# Patient Record
Sex: FEMALE | Race: BLACK OR AFRICAN AMERICAN | Employment: UNEMPLOYED | URBAN - METROPOLITAN AREA
[De-identification: names, ages, dates, MRNs, and addresses within clinical notes are randomized per-mention and may not be internally consistent; named-entity substitution may affect disease eponyms.]

---

## 2020-11-20 ENCOUNTER — HOSPITAL ENCOUNTER (OUTPATIENT)
Dept: VASCULAR LAB | Age: 70
Discharge: HOME | End: 2020-11-20
Payer: MEDICARE

## 2020-11-20 PROCEDURE — 93925 LOWER EXTREMITY STUDY: CPT

## 2020-11-23 ENCOUNTER — OFFICE VISIT (OUTPATIENT)
Dept: SURGERY | Age: 70
End: 2020-11-23
Payer: MEDICARE

## 2020-11-23 VITALS
BODY MASS INDEX: 23.04 KG/M2 | HEIGHT: 63 IN | SYSTOLIC BLOOD PRESSURE: 158 MMHG | WEIGHT: 130 LBS | DIASTOLIC BLOOD PRESSURE: 74 MMHG

## 2020-11-23 DIAGNOSIS — Z01.818 PRE-OP TESTING: ICD-10-CM

## 2020-11-23 PROCEDURE — 99204 OFFICE O/P NEW MOD 45 MIN: CPT | Performed by: STUDENT IN AN ORGANIZED HEALTH CARE EDUCATION/TRAINING PROGRAM

## 2020-11-23 ASSESSMENT — ENCOUNTER SYMPTOMS
ABDOMINAL PAIN: 0
SHORTNESS OF BREATH: 0
EYES NEGATIVE: 1
BACK PAIN: 1
VOMITING: 0
COUGH: 0
ABDOMINAL DISTENTION: 0
NAUSEA: 0

## 2020-11-23 NOTE — LETTER
PERIPHERAL ANGIOGRAM    Keerthi, your procedure has been scheduled for:          Date:   Dec. 2                      Arrival Time:  630am  Procedure at 1am     Make sure all blood work is completed before day of procedure. (CBC & BMP)     REPORT TO:  Cherrie Esquivel. Maxx Joe  Will be performing your procedure. If you have any questions or to reschedule, please call [phone] and ask for the doctor's medical assistant. WHAT IS A PERIPHERAL ANGIOGRAM?  An angiogram if a test used to diagnose problems with your arteries/veins. You may be familiar with a coronary angiogram, a test that focuses on the blood vessels feed the heart. In a peripheral angiogram, a doctor checks for problems in the blood vessels in other areas of your body. An angiogram uses real-time image guidance (fluoroscopy) and provides detailed information to help in planning the best treatment for you. How long does it take? A peripheral angiogram takes about 1-2 hours, longer if an intervention is performed. You will be in the hospital for at least 2-4 hours after the procedure is finished. HOW TO PREPARE:  *  Please notify us before the procedure if you are allergic to anything, especially x-ray     Contrast dye, shell fish, iodine, nickel or any type of jewelry. THIS IS VERY IMPORTANT. *  DO NOT eat or drink anything containing CAFFEINE for at least 24 hours  Prior to      Your procedure. *  DO NOT eat or drink anything after midnight (or 8 hours) prior to the procedure. *  If your procedure is scheduled in the afternoon, do not eat or drink anything after     ____________. You may have a light breakfast with juice. *  Please take your morning medication with a small amount of water at your normally     scheduled time, including any blood pressure pills, Plavix and Aspirin. occur, which creates delays. The staff will do everything possible to ensure your procedure remains on time.

## 2020-11-23 NOTE — PROGRESS NOTES
tablet 2    methylPREDNISolone (MEDROL, JOSESITO,) 4 MG tablet Take by mouth. 1 kit 0    ibuprofen (ADVIL;MOTRIN) 800 MG tablet Take 1 tablet by mouth every 8 hours as needed for Pain 21 tablet 3    acetaminophen-codeine (TYLENOL/CODEINE #3) 300-30 MG per tablet Take 1 tablet by mouth every 4 hours as needed for Pain for up to 3 days. Intended supply: 3 days. Take lowest dose possible to manage pain 18 tablet 0    amLODIPine (NORVASC) 10 MG tablet Take 10 mg by mouth      omeprazole (PRILOSEC) 20 MG delayed release capsule Take 1 capsule by mouth daily 30 capsule 3    ondansetron (ZOFRAN) 4 MG tablet Take 1 tablet by mouth 3 times daily as needed for Nausea or Vomiting 30 tablet 0    labetalol (NORMODYNE) 200 MG tablet Take 1 tablet by mouth 2 times daily 60 tablet 2     No current facility-administered medications for this visit.         Allergies  Allergies   Allergen Reactions    Darvon [Propoxyphene Hcl]      With Codeine    Lisinopril     Shellfish-Derived Products     Sulfa Antibiotics        Social History  Social History     Socioeconomic History    Marital status:      Spouse name: None    Number of children: None    Years of education: None    Highest education level: None   Occupational History    None   Social Needs    Financial resource strain: None    Food insecurity     Worry: None     Inability: None    Transportation needs     Medical: None     Non-medical: None   Tobacco Use    Smoking status: Current Every Day Smoker     Packs/day: 1.00     Years: 45.00     Pack years: 45.00    Smokeless tobacco: Never Used   Substance and Sexual Activity    Alcohol use: No    Drug use: No    Sexual activity: None   Lifestyle    Physical activity     Days per week: None     Minutes per session: None    Stress: None   Relationships    Social connections     Talks on phone: None     Gets together: None     Attends Orthodoxy service: None     Active member of club or organization: None Attends meetings of clubs or organizations: None     Relationship status: None    Intimate partner violence     Fear of current or ex partner: None     Emotionally abused: None     Physically abused: None     Forced sexual activity: None   Other Topics Concern    None   Social History Narrative    None       Family History  No family history on file. ROS  Review of Systems   Constitutional: Positive for activity change. Negative for chills and fatigue. Eyes: Negative. Respiratory: Negative for cough and shortness of breath. Cardiovascular: Negative for chest pain, palpitations and leg swelling. Gastrointestinal: Negative for abdominal distention, abdominal pain, nausea and vomiting. Endocrine: Negative for cold intolerance and heat intolerance. Musculoskeletal: Positive for back pain. Negative for arthralgias and gait problem. Rest pain in right foot, pain in lateral calf with ambulation   Skin: Negative for wound. Neurological: Negative for dizziness, weakness and numbness. Admits to neuropathy in right foot     Hematological: Does not bruise/bleed easily. Psychiatric/Behavioral: Negative for agitation and behavioral problems. Physical Exam  Physical Exam  Constitutional:       Appearance: Normal appearance. HENT:      Head: Normocephalic and atraumatic. Nose: Nose normal.   Eyes:      Pupils: Pupils are equal, round, and reactive to light. Neck:      Musculoskeletal: Normal range of motion. Cardiovascular:      Rate and Rhythm: Normal rate. Heart sounds: Murmur present. Pulmonary:      Effort: Pulmonary effort is normal. No respiratory distress. Breath sounds: No wheezing. Abdominal:      General: There is no distension. Tenderness: There is no abdominal tenderness. Musculoskeletal: Normal range of motion. General: Tenderness (right foot to palpation) present. No swelling. Skin:     General: Skin is warm and dry.       Capillary Refill: Capillary refill takes 2 to 3 seconds. Comments: No wounds     Neurological:      General: No focal deficit present. Mental Status: She is alert. Cranial Nerves: No cranial nerve deficit. Psychiatric:         Mood and Affect: Mood normal.         Behavior: Behavior normal.         Thought Content: Thought content normal.         Judgment: Judgment normal.        Vascular: 2+ radial, 2+ femoral and palp left DP, non palp right pedal pulses    Vitals  Vitals:    11/23/20 1307   BP: (!) 158/74   Site: Right Upper Arm   Position: Sitting   Cuff Size: Medium Adult   Weight: 130 lb (59 kg)   Height: 5' 3\" (1.6 m)       Imaging  Arterial   Right RICHARD: 0.25. This is consistent with critical arterial insufficiency at     rest.     Elevated velocity of the mid an distal superficial femoral artery suggests a     50-70% stenosis.     Elevated velocity of the proximal popliteal artery suggests a greater than     70% stenosis.     Severely monophasic flow noted in the right tibial arteries.     Left RICHARD: 0.96. This is consistent with no significant arterial     insufficiency at rest.     The majority of the waveforms are triphasic throughout the left lower     extremity.     Elevated velocity of the mid superficial femoral artery suggests a less than     50% stenosis.     Ultrasound images of the left lower extremity reveal no evidence of a     hemodynamically significant stenosis. Assessment  1. Pre-op testing    - BASIC METABOLIC PANEL; Future  2. Peripheral Arterial Disease  Angiogram    Plan  Delano Bhakta is a 79 y.o. female who is being seen for right lower extremity rest pain. Her pain is quite severe and it is limiting her daily activities. She is constant pain. She likely has had an abrupt deterioration given her clear timeline of claudication with now rest pain. I do believe that she has severe PAD based on her non-invasive testing.  She will need angiography at minimum with mild sedation. She has moderate AR based on her last Cardiology visit and has plans to see Cardiology next week. As long as they are okay with this procedure will proceed with angiography as soon as possible. Knowing her cardiac history I would like to try and treat her problem endovascularly. Encouraged smoking cessation. Continue with her current medications. This document was dictated using voice recognition software.        Maryam Garland DO, [address] [facility]

## 2020-11-24 LAB
ANION GAP SERPL CALCULATED.3IONS-SCNC: 15 MMOL/L (ref 3–16)
BUN BLDV-MCNC: 22 MG/DL (ref 7–20)
CALCIUM SERPL-MCNC: 10.1 MG/DL (ref 8.3–10.6)
CHLORIDE BLD-SCNC: 102 MMOL/L (ref 99–110)
CO2: 24 MMOL/L (ref 21–32)
CREAT SERPL-MCNC: 0.9 MG/DL (ref 0.6–1.2)
GFR AFRICAN AMERICAN: >60
GFR NON-AFRICAN AMERICAN: >60
GLUCOSE BLD-MCNC: 75 MG/DL (ref 70–99)
POTASSIUM SERPL-SCNC: 4.6 MMOL/L (ref 3.5–5.1)
SODIUM BLD-SCNC: 141 MMOL/L (ref 136–145)

## 2020-12-01 ENCOUNTER — TELEPHONE (OUTPATIENT)
Dept: SURGERY | Age: 70
End: 2020-12-01

## 2020-12-01 NOTE — TELEPHONE ENCOUNTER
Spoke with patient regarding scheduled Angiogram on 12/02/2020 with Dr. Oumou Boone. Patient is asked to arrive by 6:30 AM @ [address] after midnight. You will need someone to drive you home following this procedure, and to stay with you for the remainder of the day. Please bring a Photo ID & Insurance card with you, and check-in at the [address] down the right-hand hallway on the first floor. Angiogram is scheduled to start at approx. 7:30 AM and should take approx. 90 minutes. If the hospital needs any further information, someone will give you a call. Patient expressed a verbal understanding of these instructions and had no further questions at this time. Call ended.

## 2020-12-02 ENCOUNTER — HOSPITAL ENCOUNTER (OUTPATIENT)
Dept: CARDIAC CATH/INVASIVE PROCEDURES | Age: 70
Discharge: HOME | End: 2020-12-02
Payer: MEDICARE

## 2020-12-02 VITALS
TEMPERATURE: 97.2 F | BODY MASS INDEX: 22.32 KG/M2 | SYSTOLIC BLOOD PRESSURE: 140 MMHG | RESPIRATION RATE: 19 BRPM | WEIGHT: 126 LBS | HEIGHT: 63 IN | HEART RATE: 67 BPM | DIASTOLIC BLOOD PRESSURE: 73 MMHG | OXYGEN SATURATION: 100 %

## 2020-12-02 LAB
HCT VFR BLD CALC: 40.2 % (ref 36–48)
HEMOGLOBIN: 13.1 G/DL (ref 12–16)
MCH RBC QN AUTO: 27.9 PG (ref 26–34)
MCHC RBC AUTO-ENTMCNC: 32.7 G/DL (ref 31–36)
MCV RBC AUTO: 85.2 FL (ref 80–100)
PDW BLD-RTO: 15.3 % (ref 12.4–15.4)
PLATELET # BLD: 180 K/UL (ref 135–450)
PMV BLD AUTO: 7.9 FL (ref 5–10.5)
RBC # BLD: 4.71 M/UL (ref 4–5.2)
WBC # BLD: 10.5 K/UL (ref 4–11)

## 2020-12-02 PROCEDURE — 2580000003 HC RX 258

## 2020-12-02 PROCEDURE — 2500000003 HC RX 250 WO HCPCS

## 2020-12-02 PROCEDURE — C1760 CLOSURE DEV, VASC: HCPCS

## 2020-12-02 PROCEDURE — C1894 INTRO/SHEATH, NON-LASER: HCPCS

## 2020-12-02 PROCEDURE — 75710 ARTERY X-RAYS ARM/LEG: CPT

## 2020-12-02 PROCEDURE — 37224 HC FEM POP TERRITORY PLASTY: CPT

## 2020-12-02 PROCEDURE — C2623 CATH, TRANSLUMIN, DRUG-COAT: HCPCS

## 2020-12-02 PROCEDURE — 99152 MOD SED SAME PHYS/QHP 5/>YRS: CPT

## 2020-12-02 PROCEDURE — 76937 US GUIDE VASCULAR ACCESS: CPT | Performed by: STUDENT IN AN ORGANIZED HEALTH CARE EDUCATION/TRAINING PROGRAM

## 2020-12-02 PROCEDURE — 6360000004 HC RX CONTRAST MEDICATION: Performed by: STUDENT IN AN ORGANIZED HEALTH CARE EDUCATION/TRAINING PROGRAM

## 2020-12-02 PROCEDURE — 6370000000 HC RX 637 (ALT 250 FOR IP)

## 2020-12-02 PROCEDURE — C1725 CATH, TRANSLUMIN NON-LASER: HCPCS

## 2020-12-02 PROCEDURE — 75625 CONTRAST EXAM ABDOMINL AORTA: CPT

## 2020-12-02 PROCEDURE — 2709999900 HC NON-CHARGEABLE SUPPLY

## 2020-12-02 PROCEDURE — C1887 CATHETER, GUIDING: HCPCS

## 2020-12-02 PROCEDURE — 75774 ARTERY X-RAY EACH VESSEL: CPT

## 2020-12-02 PROCEDURE — C1769 GUIDE WIRE: HCPCS

## 2020-12-02 PROCEDURE — 6360000002 HC RX W HCPCS

## 2020-12-02 PROCEDURE — 99152 MOD SED SAME PHYS/QHP 5/>YRS: CPT | Performed by: STUDENT IN AN ORGANIZED HEALTH CARE EDUCATION/TRAINING PROGRAM

## 2020-12-02 PROCEDURE — 37224 PR REVSC OPN/PRG FEM/POP W/ANGIOPLASTY UNI: CPT | Performed by: STUDENT IN AN ORGANIZED HEALTH CARE EDUCATION/TRAINING PROGRAM

## 2020-12-02 PROCEDURE — 75710 ARTERY X-RAYS ARM/LEG: CPT | Performed by: STUDENT IN AN ORGANIZED HEALTH CARE EDUCATION/TRAINING PROGRAM

## 2020-12-02 PROCEDURE — 99153 MOD SED SAME PHYS/QHP EA: CPT

## 2020-12-02 PROCEDURE — 75625 CONTRAST EXAM ABDOMINL AORTA: CPT | Performed by: STUDENT IN AN ORGANIZED HEALTH CARE EDUCATION/TRAINING PROGRAM

## 2020-12-02 PROCEDURE — 85027 COMPLETE CBC AUTOMATED: CPT

## 2020-12-02 RX ORDER — ASPIRIN 81 MG/1
81 TABLET ORAL DAILY
Qty: 90 TABLET | Refills: 1 | Status: SHIPPED | OUTPATIENT
Start: 2020-12-02 | End: 2021-03-22 | Stop reason: SDUPTHER

## 2020-12-02 RX ORDER — SODIUM CHLORIDE 0.9 % (FLUSH) 0.9 %
10 SYRINGE (ML) INJECTION PRN
Status: DISCONTINUED | OUTPATIENT
Start: 2020-12-02 | End: 2020-12-02 | Stop reason: SDUPTHER

## 2020-12-02 RX ORDER — SODIUM CHLORIDE 9 MG/ML
INJECTION, SOLUTION INTRAVENOUS CONTINUOUS
Status: DISCONTINUED | OUTPATIENT
Start: 2020-12-02 | End: 2020-12-03 | Stop reason: HOSPADM

## 2020-12-02 RX ORDER — SODIUM CHLORIDE 0.9 % (FLUSH) 0.9 %
10 SYRINGE (ML) INJECTION EVERY 12 HOURS SCHEDULED
Status: DISCONTINUED | OUTPATIENT
Start: 2020-12-02 | End: 2020-12-02 | Stop reason: SDUPTHER

## 2020-12-02 RX ORDER — SODIUM CHLORIDE 0.9 % (FLUSH) 0.9 %
10 SYRINGE (ML) INJECTION EVERY 12 HOURS SCHEDULED
Status: DISCONTINUED | OUTPATIENT
Start: 2020-12-02 | End: 2020-12-03 | Stop reason: HOSPADM

## 2020-12-02 RX ORDER — ACETAMINOPHEN 325 MG/1
650 TABLET ORAL EVERY 4 HOURS PRN
Status: DISCONTINUED | OUTPATIENT
Start: 2020-12-02 | End: 2020-12-03 | Stop reason: HOSPADM

## 2020-12-02 RX ORDER — SODIUM CHLORIDE 0.9 % (FLUSH) 0.9 %
10 SYRINGE (ML) INJECTION PRN
Status: DISCONTINUED | OUTPATIENT
Start: 2020-12-02 | End: 2020-12-03 | Stop reason: HOSPADM

## 2020-12-02 RX ORDER — CLOPIDOGREL BISULFATE 75 MG/1
75 TABLET ORAL DAILY
Qty: 30 TABLET | Refills: 3 | Status: SHIPPED | OUTPATIENT
Start: 2020-12-02 | End: 2021-03-22

## 2020-12-02 RX ADMIN — IOPAMIDOL 115 ML: 755 INJECTION, SOLUTION INTRAVENOUS at 08:13

## 2020-12-02 NOTE — H&P
H&P Update - see note from 20    I have reviewed the history and physical and examined the patient and find no relevant changes. I have reviewed with the patient and/or family the risks, benefits, and alternatives to the procedure. Pre-sedation Assessment    Patient:  Monet Irwin   :   1950  Intended Procedure: angiogram    Nursing notes reviewed and agreed. Medications reviewed  Allergies: Allergies   Allergen Reactions    Darvon [Propoxyphene Hcl]      With Codeine    Ibuprofen      Upset stomach    Lisinopril     Shellfish-Derived Products     Sulfa Antibiotics     Tylenol With Codeine #3 [Acetaminophen-Codeine]          Pre-Procedure Assessment/Plan:  ASA 2 - Patient with mild systemic disease with no functional limitations    Mallampati Airway Assessment:  Mallampati Class II - (soft palate, fauces & uvula are visible)    Level of Sedation Plan: Moderate sedation    Post Procedure plan: Return to same level of care    Dimitrios Oconnell DO, [address] [facility] Endovascular [facility]

## 2020-12-03 NOTE — OP NOTE
[address]                                OPERATIVE REPORT    PATIENT NAME: Aleksandr Le                 :        1950  MED REC NO:   [id number]                          ROOM:  ACCOUNT NO:   [de-identified]                           ADMIT DATE: 2020  PROVIDER:     Taylor Gonzalez DO    DATE OF PROCEDURE:  2020    PREOPERATIVE DIAGNOSIS:  Rest pain of the right lower extremity. POSTOPERATIVE DIAGNOSIS:  Rest pain of the right lower extremity. OPERATION PERFORMED:  1. Ultrasound-guided access to the left common femoral artery. The  vessel was patent and pulsatile. An image was saved and placed in the  patient's medical record. 2.  Abdominal aortogram.  3.  Right lower extremity angiogram.  4.  Angioplasty of the right popliteal and superficial femoral artery  using a 4 x 40 mm balloon. 5.  Angioplasty of the right popliteal and SFA using a drug-coated  balloon, 5 x 250 mm balloon. SURGEON:  Taylor Gonzalez DO    ASSISTANT:  None. ANESTHESIA:  Local sedation. CONTRAST:  80 mL. ESTIMATED BLOOD LOSS:  Minimal.    ASA CLASSIFICATION:  II.    MALLAMPATI SCORE:  II.    COMPLICATIONS:  None apparent. FINDINGS:  The patient had severe stenosis and possibly abhijit occlusion  of her proximal popliteal artery. There was also severe ectasia of her  superficial femoral artery. This all responded well to angioplasty  alone without the need for stenting or atherectomy. The patient  exhibited palpable dorsalis pedis pulse at the end of the procedure. INDICATIONS:  This is a 26-year-old female who presented to my office  with severe pain in the right foot. She has no known history of  diabetes. She is a current everyday smoker. She presented to PCP's  office with history of pain in her foot.   She underwent arterial duplex,  which demonstrated that she had an RICHARD of 0.25 in her right lower  extremity. It also demonstrated that she had severe stenosis in her  popliteal as well as SFA. Therefore, it was discussed that she might  benefit from angiography. All risks and benefits including pain,  infection, bleeding, pseudoaneurysm were discussed with the patient  clearly. All written informed consent was signed. OPERATIVE PROCEDURE:  The patient was brought into the cath lab and  placed supine on the table. Appropriate monitoring lines including  continuous blood pressure, EKG, and pulse oximetry were placed on the  patient. A qualified nurse observer was in the room to administer  sedation. We used moderate sedation using a combination of fentanyl and  Versed, which I did monitor for 30 minutes. A time-out was called for  the indication, patient, laterality. The bilateral groins were prepped  and draped in the usual sterile fashion. The left common femoral artery was then visualized under ultrasound and  accessed using the micropuncture technique. The needle was directly  visualized going to the artery. We then upsized to a 5-Ethiopian sheath  over the Bentson wire and laid subsequent to the micro sheath. I placed  an Omni Flush catheter into the abdominal aorta. Flush abdominal  aortogram demonstrated abdominal aortic aneurysm as well as bilateral  patent renal arteries. There was no evidence of flow-limiting stenosis  in the common femoral and hypogastric arteries. There was no evidence  of flow-limiting stenosis in the right external iliac. We then proceeded to catheterize the right lower extremity using the  Bentson wire and the Omni Flush catheter. I placed the Omni Flush  catheter into the distal external iliac artery. I then performed a  right lower extremity angiogram, which demonstrated widely patent common  femoral artery, widely patent profunda femoris, patent but severely  ectatic superficial femoral artery both proximally and into the mid  portion.   There was a greater than 50% stenosis distally in the SFA as  well as a near occlusion of the popliteal artery in the P1 segment. The  patient after that exhibited a normal popliteal artery below the knee  and three-vessel runoff. I proceeded to heparinize the patient using 5000 units of heparin. I  placed an 0.018 V-18 wire into the popliteal artery. I then placed a  6-55 Rickie sheath up and over the bifurcation. We then flushed  appropriately. I took serial images to ensure that it crossed the  lesion appropriately. I then used a combination of 0.018 catheter and  V-18 wire to cross the lesion. This was inconsequential.  I then  proceeded to predilate using the 4 x 40 mm balloon in the popliteal  lesion. This responded well to angioplasty with a small dissection. I also  dilated the various areas in the SFA, particularly distal to the  stenosis. This responded well as well. Given that this appeared to be  more soft plaque as opposed to more calcified plaque, I did not feel  that atherectomy was warranted. I, therefore, did use drug-coated  balloon angioplasty to allow for prolonged inflation, particularly in  the setting of some residual nonflow-limiting dissection. We used a 5 x  250 mm balloon inflated for 3 minutes. Post-inflation angiogram demonstrated that the SFA was widely patent,  the popliteal was widely patent, and the three-vessel runoff was  markedly improved as was the flow. There was an area of less than 50%  stenosis in the proximal SFA. Due to her propensity, appears to develop  dissection,  I do not feel like this needed to be dilated. The  patient had palpable dorsalis pedis pulses at the end of the procedure. We then pulled the catheter and wires out and brought the sheath back  over to the left side. We exchanged for a short 6-Malaysian sheath.   I  shot the femoral arteriograms, which demonstrated that they had good  healthy puncture of the common femoral artery just above the

## 2021-03-22 DIAGNOSIS — I70.229 REST PAIN OF LOWER EXTREMITY DUE TO ATHEROSCLEROSIS (HCC): Primary | ICD-10-CM

## 2021-03-22 RX ORDER — ASPIRIN 81 MG/1
81 TABLET ORAL DAILY
Qty: 90 TABLET | Refills: 1 | Status: SHIPPED | OUTPATIENT
Start: 2021-03-22

## 2021-03-22 RX ORDER — CLOPIDOGREL BISULFATE 75 MG/1
TABLET ORAL
Qty: 90 TABLET | Refills: 1 | Status: SHIPPED | OUTPATIENT
Start: 2021-03-22 | End: 2021-05-20

## 2021-03-29 ENCOUNTER — OFFICE VISIT (OUTPATIENT)
Dept: SURGERY | Age: 71
End: 2021-03-29
Payer: MEDICARE

## 2021-03-29 VITALS
WEIGHT: 126 LBS | HEIGHT: 63 IN | DIASTOLIC BLOOD PRESSURE: 76 MMHG | BODY MASS INDEX: 22.32 KG/M2 | SYSTOLIC BLOOD PRESSURE: 138 MMHG

## 2021-03-29 DIAGNOSIS — I73.9 PERIPHERAL ARTERIAL DISEASE (HCC): Primary | ICD-10-CM

## 2021-03-29 PROCEDURE — 99212 OFFICE O/P EST SF 10 MIN: CPT | Performed by: STUDENT IN AN ORGANIZED HEALTH CARE EDUCATION/TRAINING PROGRAM

## 2021-03-29 ASSESSMENT — ENCOUNTER SYMPTOMS
BACK PAIN: 0
SHORTNESS OF BREATH: 0

## 2021-03-29 NOTE — PROGRESS NOTES
Ayden Cortes (:  1950) is a 70 y.o. female,Established patient, here for evaluation of the following chief complaint(s):  Follow-up (3m f/u rest pain lower extremity. pt says pain has gotten a lot better in legs. pt is still having some numbness in toes she understands this may take time to heal. )      ASSESSMENT/PLAN:  1. Peripheral arterial disease ([address]    This is a 22-year-old female who presents for follow-up after angiogram.  She is doing remarkably well. We will plan to see her in  which is 3 months from now for a 6-month follow-up from her index procedure. We will then evaluate her with an arterial duplex. No follow-ups on file. SUBJECTIVE/OBJECTIVE:  This is a 22-year-old female patient who presents to the office for follow-up after undergoing right lower extremity SFA distal and popliteal artery angioplasty. The patient had severe stenosis that was leading to claudication and borderline rest pain symptoms. She responded well to treatment with angioplasty alone. She has now improved inline flow. She was taking dual antiplatelet therapy now but has stopped her Plavix. She is ambulating well. She still has some numbness in the dorsum of her foot particularly down to her toes. This is not overly bothersome. She has been reading about this and was wondering if this is likely just a manifestation of the ischemic insult and possibly may not even improve despite reperfusion. She denies any rest pain or tissue loss. Overall she is very satisfied with however things have gone. Review of Systems   Constitutional: Negative for activity change. HENT: Negative for congestion. Eyes: Negative for visual disturbance. Respiratory: Negative for shortness of breath. Cardiovascular: Negative for chest pain and leg swelling. Musculoskeletal: Negative for back pain, gait problem and myalgias. Skin: Negative for wound. Neurological: Positive for numbness. Hematological: Bruises/bleeds easily. Psychiatric/Behavioral: Negative for agitation. Physical Exam  Constitutional:       Appearance: Normal appearance. HENT:      Head: Normocephalic and atraumatic. Nose: Nose normal.   Eyes:      Extraocular Movements: Extraocular movements intact. Neck:      Musculoskeletal: Normal range of motion. Cardiovascular:      Rate and Rhythm: Normal rate and regular rhythm. Pulmonary:      Effort: Pulmonary effort is normal. No respiratory distress. Abdominal:      Palpations: Abdomen is soft. Musculoskeletal: Normal range of motion. General: No tenderness. Skin:     General: Skin is warm and dry. Capillary Refill: Capillary refill takes less than 2 seconds. Neurological:      General: No focal deficit present. Mental Status: She is alert. Psychiatric:         Mood and Affect: Mood normal.         Behavior: Behavior normal.         Thought Content:  Thought content normal.         Judgment: Judgment normal.         DIAGNOSIS: claudication  VASCULAR SURGERY HX: popliteal angioplasty    PULSE EXAMINATION  Femoral:palp  Dorsalis Pedis: palp  Posterior Tibial: palp  Radial: palp    Alban Hale DO, [address] [facility] and Endovascular Surgery

## 2023-01-26 ENCOUNTER — OFFICE VISIT (OUTPATIENT)
Dept: VASCULAR SURGERY | Age: 73
End: 2023-01-26
Payer: MEDICARE

## 2023-01-26 VITALS — BODY MASS INDEX: 21.61 KG/M2 | HEIGHT: 63 IN

## 2023-01-26 DIAGNOSIS — I73.9 PERIPHERAL ARTERIAL DISEASE (HCC): Primary | ICD-10-CM

## 2023-01-26 PROCEDURE — 99213 OFFICE O/P EST LOW 20 MIN: CPT | Performed by: STUDENT IN AN ORGANIZED HEALTH CARE EDUCATION/TRAINING PROGRAM

## 2023-01-26 PROCEDURE — 1123F ACP DISCUSS/DSCN MKR DOCD: CPT | Performed by: STUDENT IN AN ORGANIZED HEALTH CARE EDUCATION/TRAINING PROGRAM

## 2023-01-26 NOTE — PROGRESS NOTES
Mejia Gill (:  1950) is a 67 y.o. female,Established patient, here for evaluation of the following chief complaint(s):  Leg Pain         ASSESSMENT/PLAN:  1. Peripheral arterial disease ([address]    This is a 67year old female patient who presents for left leg pain. She has known PAD in her right leg. She has not been evaluated for 2 years. Her left leg symptoms combined with non palp pulse could be suggestive of similar arterial pathology as in her right. Her foot is warm with normal color, motor and sensation. Given that her symptoms are in the more chronic phase will obtain duplex as her symptoms could also very well be explained by a simple muscle or ligament injury as well. If severe PAD represented will consider angiogram. All questions answered. Subjective   SUBJECTIVE/OBJECTIVE:  This is a 67year old female patient who presents for evaluation of left leg discomfort. She has been having occasional ankle pain and then she states that  while at the grocery store she experienced more intense lateral calf pain. Since then she has had some discomfort continually in her left leg with walking. Her right leg seems okay. No rest pain. No ulcers but does state she had a blister on her left foot at some point. Objective   Physical Exam  Constitutional:       Appearance: Normal appearance. Cardiovascular:      Rate and Rhythm: Normal rate and regular rhythm. Comments: Palp right DP, non palp left DP or PT  Pulmonary:      Effort: Pulmonary effort is normal. No respiratory distress. Musculoskeletal:      Right lower leg: No edema. Left lower leg: No edema. Comments: Motor and sensory exam in both feet within normal limits   Skin:     General: Skin is warm and dry. Findings: No lesion. Neurological:      General: No focal deficit present. Mental Status: She is alert.           Leandra Riddle DO, FACS, FSVS, [address] [facility] and Endovascular Surgery

## 2023-02-09 ENCOUNTER — PROCEDURE VISIT (OUTPATIENT)
Dept: SURGERY | Age: 73
End: 2023-02-09
Payer: MEDICARE

## 2023-02-09 ENCOUNTER — TELEPHONE (OUTPATIENT)
Dept: VASCULAR SURGERY | Age: 73
End: 2023-02-09

## 2023-02-09 DIAGNOSIS — I73.9 PERIPHERAL ARTERIAL DISEASE (HCC): Primary | ICD-10-CM

## 2023-02-09 DIAGNOSIS — I70.229 REST PAIN OF LOWER EXTREMITY DUE TO ATHEROSCLEROSIS (HCC): Primary | ICD-10-CM

## 2023-02-09 PROCEDURE — 93925 LOWER EXTREMITY STUDY: CPT | Performed by: SURGERY

## 2023-02-09 RX ORDER — TRAMADOL HYDROCHLORIDE 50 MG/1
50 TABLET ORAL EVERY 4 HOURS PRN
Qty: 30 TABLET | Refills: 0 | Status: SHIPPED | OUTPATIENT
Start: 2023-02-09 | End: 2023-02-16

## 2023-02-09 NOTE — TELEPHONE ENCOUNTER
Pt had BLE ADS today. Left RICHARD is 0.22. Left SFA occlusion was also found. Pt requesting pain medication until intervention can be planned.       [facility] [address]

## 2023-02-09 NOTE — LETTER
PERIPHERAL ANGIOGRAM INSTRUCTIONS      REPORT TO:  [facility] CARDIAC CATHETERIZATION LAB      WHAT IS A PERIPHERAL ANGIOGRAM?  An angiogram is a test used to diagnose problems with your arteries/veins. You may be familiar with a coronary angiogram, a test that focuses on the blood vessels feed the heart. In a peripheral angiogram, a doctor checks for problems in the blood vessels in other areas of your body. An angiogram uses real-time image guidance (fluoroscopy) and provides detailed information to help in planning the best treatment for you. How long does it take? A peripheral angiogram takes about 1-2 hours, longer if an intervention is performed. You will be in the hospital for at least 2-4 hours after the procedure is finished. HOW TO PREPARE:  *  Please notify us before the procedure if you are allergic to anything, especially x-ray     Contrast dye, shell fish, iodine, nickel or any type of jewelry. THIS IS VERY IMPORTANT. *  IT IS NECESSARY FOR YOU TO HAVE LABS DRAWN (CBC AND BMP) NO                     MORE THAN 30 DAYS PRIOR TO THIS PROCEDURE. FAILURE TO                COMPLETE THIS TASK MAY RESULT IN CANCELLATION OF CASE. IF LABS ARE PERFORMED AT A FACILITY OTHER THAN [facility],   PLEASE HAVE THESE RESULTS FAXED IMMEDIATELY [phone]. *  DO NOT eat or drink anything containing CAFFEINE for at least 24 hours  Prior to      Your procedure. *  DO NOT eat or drink anything after midnight (or 8 hours) prior to the procedure. *  If your procedure is scheduled in the afternoon, do not eat or drink anything after     ____________. You may have a light breakfast with juice. *  Please take your morning medication with a small amount of water at your normally     scheduled time, including any blood pressure pills. Hold Plavix and Aspirin morning of. *  Please HOLD any over the counter vitamins or supplements.   *  If you are on Coumadin, Warfarin, Jantoven, Xarelto, Pradaxa or Eliquis,  Please     Hold these 5 days prior to this procedure. *  If you are on METFORMIN, please HOLD it the day before & the day of your     Procedure. *  If you are DIABETIC or take INSULIN, you may take a 1/2 dose the night before. *  Please HOLD all Diabetic medications on the day of your procedrue. * DO NOT take any diuretics (water pills) such as Lasix, Bumex, Demadex,     Furosemide, or Zaroxolyn on the day of your procedure. *  If your diuretic is combined with your blood pressure medication, you will take it     as usual.  *  You MUST have someone to drive you home - you are not allowed to drive      For 24 hours after your procedure. *  Someone needs to stay with you the night of your procedure if you go home. *  You are NOT allowed to do any heavy lifting (over 15 pounds), (a gallon of milk is      7 lbs), no squatting or crouching for at least one week after your procedure. Additional discharge instructions will be given to your at the time of your discharge. Examples of products that contain CAFFEINE are:  Brewed coffee   Instant Tea   Mt. Dew  Cocoa  Instant coffee   Coca-Cola   Pepsi                 Decaffeinated Pepsi  Decaffeinated Coffee  Diet Coke    Mr. Jailene Stratton   Caffeine-free Soda  Iced Tea   Tab    Mellow Yellow    Foods containing CAFFEINE PRODUCTS are:  Chocolate Candy  Baking Chocolate  Chocolate Pudding     Brownies           Chocolate Cake  Chocolate Milk Chocoate-coated Candy  Chocolate Shakes    Prescription drugs containing CAFFEINE are:  Cafergot (all forms)  Esgic    Fiorcet    Fiornial (all forms)  Norgesic Forte   Norgesic   Synalgos-DC   Wigraine (all forms)    Over the Counter drugs containing CAFFEINE are:  Anacin    No-Doz   Excedrin (all forms)    Please keep in mind that emergencies requiring immediate intervention do occur, which creates delays.   The staff will do everything possible to ensure your procedure remains on time.

## 2023-02-09 NOTE — LETTER
[facility] - Cardiac Cath Lab    Phone [phone] Fax: [phone]          Patient Scheduling Form:     Date: 2023   Time: 0800  Arrival: 0630    Procedure: Angiogram of the Aorta and Left Lower Extremity, Possible Revascularization    Anesthesia Required:  LOC/SED    Physician: Anna Torres MD  -------------------------------------------------------------------------------------------------------------------  Patients Name:Keerthi Paul    Date of Birth:  Sex:  Female    Patient Social Security #:     Mailing Address:  [address] [place] [address]    Home Phone # [phone] Cell   Alternate # Magalis Ziegler, daughter [phone]    Primary Care Physicain: Trae Briggs MD    ICD-10 Code / Diagnosis:  I73.9 Peripheral Arterial Disease  ------------------------------------------------------------------------------------------------------------------  INSURANCE INFORMATION:  Payer/Plan Subscr  Sex Relation Sub. Ins. ID Effective Group Num   1.  BCBS MEDICARENile Porteous* 1950 Female Self [id number] [id number]                                   [address]       Form Completed By:  Elle Islas [address] [place] [address]    CPT: 27600; 40407

## 2023-02-10 NOTE — TELEPHONE ENCOUNTER
Pt informed. Instructions for angio reviewed and mailed to pt home per her request.  Will plan for 2-20-23.

## 2023-02-15 DIAGNOSIS — I70.229 REST PAIN OF LOWER EXTREMITY DUE TO ATHEROSCLEROSIS (HCC): ICD-10-CM

## 2023-02-15 DIAGNOSIS — I73.9 PERIPHERAL ARTERIAL DISEASE (HCC): ICD-10-CM

## 2023-02-15 DIAGNOSIS — I70.229 REST PAIN OF LOWER EXTREMITY DUE TO ATHEROSCLEROSIS (HCC): Primary | ICD-10-CM

## 2023-02-15 LAB
ANION GAP SERPL CALCULATED.3IONS-SCNC: 9 MMOL/L (ref 3–16)
BASOPHILS ABSOLUTE: 0 K/UL (ref 0–0.2)
BASOPHILS RELATIVE PERCENT: 0.2 %
BUN BLDV-MCNC: 12 MG/DL (ref 7–20)
CALCIUM SERPL-MCNC: 9.6 MG/DL (ref 8.3–10.6)
CHLORIDE BLD-SCNC: 103 MMOL/L (ref 99–110)
CO2: 26 MMOL/L (ref 21–32)
CREAT SERPL-MCNC: 0.9 MG/DL (ref 0.6–1.2)
EOSINOPHILS ABSOLUTE: 0.1 K/UL (ref 0–0.6)
EOSINOPHILS RELATIVE PERCENT: 1.2 %
GFR SERPL CREATININE-BSD FRML MDRD: >60 ML/MIN/{1.73_M2}
GLUCOSE BLD-MCNC: 102 MG/DL (ref 70–99)
HCT VFR BLD CALC: 40.6 % (ref 36–48)
HEMOGLOBIN: 13.3 G/DL (ref 12–16)
LYMPHOCYTES ABSOLUTE: 2.1 K/UL (ref 1–5.1)
LYMPHOCYTES RELATIVE PERCENT: 35 %
MCH RBC QN AUTO: 27.7 PG (ref 26–34)
MCHC RBC AUTO-ENTMCNC: 32.6 G/DL (ref 31–36)
MCV RBC AUTO: 84.9 FL (ref 80–100)
MONOCYTES ABSOLUTE: 0.5 K/UL (ref 0–1.3)
MONOCYTES RELATIVE PERCENT: 7.8 %
NEUTROPHILS ABSOLUTE: 3.3 K/UL (ref 1.7–7.7)
NEUTROPHILS RELATIVE PERCENT: 55.8 %
PDW BLD-RTO: 14.8 % (ref 12.4–15.4)
PLATELET # BLD: 177 K/UL (ref 135–450)
PMV BLD AUTO: 8.7 FL (ref 5–10.5)
POTASSIUM SERPL-SCNC: 4.1 MMOL/L (ref 3.5–5.1)
RBC # BLD: 4.79 M/UL (ref 4–5.2)
SODIUM BLD-SCNC: 138 MMOL/L (ref 136–145)
WBC # BLD: 6 K/UL (ref 4–11)

## 2023-02-20 ENCOUNTER — HOSPITAL ENCOUNTER (OUTPATIENT)
Dept: CARDIAC CATH/INVASIVE PROCEDURES | Age: 73
Discharge: HOME | End: 2023-02-20
Payer: MEDICARE

## 2023-02-20 VITALS
OXYGEN SATURATION: 100 % | SYSTOLIC BLOOD PRESSURE: 137 MMHG | WEIGHT: 130 LBS | DIASTOLIC BLOOD PRESSURE: 65 MMHG | HEIGHT: 63 IN | RESPIRATION RATE: 14 BRPM | TEMPERATURE: 97.2 F | HEART RATE: 61 BPM | BODY MASS INDEX: 23.04 KG/M2

## 2023-02-20 PROBLEM — I73.9 PERIPHERAL ARTERIAL DISEASE (HCC): Status: ACTIVE | Noted: 2023-02-20

## 2023-02-20 PROCEDURE — 99152 MOD SED SAME PHYS/QHP 5/>YRS: CPT | Performed by: STUDENT IN AN ORGANIZED HEALTH CARE EDUCATION/TRAINING PROGRAM

## 2023-02-20 PROCEDURE — 75710 ARTERY X-RAYS ARM/LEG: CPT | Performed by: STUDENT IN AN ORGANIZED HEALTH CARE EDUCATION/TRAINING PROGRAM

## 2023-02-20 PROCEDURE — C1725 CATH, TRANSLUMIN NON-LASER: HCPCS

## 2023-02-20 PROCEDURE — C1876 STENT, NON-COA/NON-COV W/DEL: HCPCS

## 2023-02-20 PROCEDURE — 2580000003 HC RX 258

## 2023-02-20 PROCEDURE — 37224 PR REVSC OPN/PRG FEM/POP W/ANGIOPLASTY UNI: CPT | Performed by: STUDENT IN AN ORGANIZED HEALTH CARE EDUCATION/TRAINING PROGRAM

## 2023-02-20 PROCEDURE — 2500000003 HC RX 250 WO HCPCS

## 2023-02-20 PROCEDURE — 37224 HC FEM POP TERRITORY PLASTY: CPT

## 2023-02-20 PROCEDURE — 75774 ARTERY X-RAY EACH VESSEL: CPT

## 2023-02-20 PROCEDURE — 75625 CONTRAST EXAM ABDOMINL AORTA: CPT

## 2023-02-20 PROCEDURE — C1894 INTRO/SHEATH, NON-LASER: HCPCS

## 2023-02-20 PROCEDURE — 75710 ARTERY X-RAYS ARM/LEG: CPT

## 2023-02-20 PROCEDURE — 37221 HC ILIAC TERRITORY PLASTY STENT: CPT

## 2023-02-20 PROCEDURE — 37221 PR REVSC OPN/PRQ ILIAC ART W/STNT PLMT & ANGIOPLSTY: CPT | Performed by: STUDENT IN AN ORGANIZED HEALTH CARE EDUCATION/TRAINING PROGRAM

## 2023-02-20 PROCEDURE — C2623 CATH, TRANSLUMIN, DRUG-COAT: HCPCS

## 2023-02-20 PROCEDURE — C1760 CLOSURE DEV, VASC: HCPCS

## 2023-02-20 PROCEDURE — C1769 GUIDE WIRE: HCPCS

## 2023-02-20 PROCEDURE — 2709999900 HC NON-CHARGEABLE SUPPLY

## 2023-02-20 PROCEDURE — 75625 CONTRAST EXAM ABDOMINL AORTA: CPT | Performed by: STUDENT IN AN ORGANIZED HEALTH CARE EDUCATION/TRAINING PROGRAM

## 2023-02-20 PROCEDURE — 99153 MOD SED SAME PHYS/QHP EA: CPT

## 2023-02-20 PROCEDURE — 6360000002 HC RX W HCPCS

## 2023-02-20 PROCEDURE — 99152 MOD SED SAME PHYS/QHP 5/>YRS: CPT

## 2023-02-20 PROCEDURE — 6360000004 HC RX CONTRAST MEDICATION: Performed by: STUDENT IN AN ORGANIZED HEALTH CARE EDUCATION/TRAINING PROGRAM

## 2023-02-20 RX ORDER — SODIUM CHLORIDE 9 MG/ML
INJECTION, SOLUTION INTRAVENOUS PRN
Status: DISCONTINUED | OUTPATIENT
Start: 2023-02-20 | End: 2023-02-21 | Stop reason: HOSPADM

## 2023-02-20 RX ORDER — SODIUM CHLORIDE 0.9 % (FLUSH) 0.9 %
5-40 SYRINGE (ML) INJECTION PRN
Status: DISCONTINUED | OUTPATIENT
Start: 2023-02-20 | End: 2023-02-21 | Stop reason: HOSPADM

## 2023-02-20 RX ORDER — SODIUM CHLORIDE 0.9 % (FLUSH) 0.9 %
5-40 SYRINGE (ML) INJECTION EVERY 12 HOURS SCHEDULED
Status: DISCONTINUED | OUTPATIENT
Start: 2023-02-20 | End: 2023-02-21 | Stop reason: HOSPADM

## 2023-02-20 RX ORDER — ACETAMINOPHEN 325 MG/1
650 TABLET ORAL EVERY 4 HOURS PRN
Status: DISCONTINUED | OUTPATIENT
Start: 2023-02-20 | End: 2023-02-21 | Stop reason: HOSPADM

## 2023-02-20 RX ADMIN — IOPAMIDOL 130 ML: 755 INJECTION, SOLUTION INTRAVENOUS at 08:42

## 2023-02-20 NOTE — H&P
H&P Update     I have reviewed the history and physical and examined the patient and find no relevant changes. I have reviewed with the patient and/or family the risks, benefits, and alternatives to the procedure. I HAVE PRESENTED REASONABLE ALTERNATIVES TO THE PATIENT'S PROPOSED CARE, TREATMENT, AND SERVICES. THE DISCUSSION I HAVE DONE ENCOMPASSED RISKS, BENEFITS, AND SIDE EFFECTS RELATED TO THE ALTERNATIVES AND THE RISKS RELATED TO NOT RECEIVING THE PROPOSED CARE, TREATMENT, SERVICES. Pre-sedation Assessment    Patient:  Stewart Siddiqui   :   1950  Intended Procedure: bilateral lower extremity angiogram    Vitals:    23 0715   BP: 137/65   Pulse: 61   Resp: 14   Temp: 97.2 °F (36.2 °C)   SpO2: 100%       Nursing notes reviewed and agreed. Medications reviewed  Allergies: Allergies   Allergen Reactions    Latex      Latex Gloves    Darvon [Propoxyphene Hcl]      With Codeine    Ibuprofen      Upset stomach    Lisinopril     Shellfish-Derived Products     Sulfa Antibiotics     Tylenol With Codeine #3 [Acetaminophen-Codeine]          Pre-Procedure Assessment/Plan:  ASA 2 - Patient with mild systemic disease with no functional limitations    Mallampati Airway Assessment:  Mallampati Class II - (soft palate, fauces & uvula are visible)    Level of Sedation Plan: Moderate sedation    Post Procedure plan: Return to same level of care    Greyson Perez DO, FACS, FSVS, [facility], located within [facility]

## 2023-02-21 NOTE — OP NOTE
[address] [place]ta                                 OPERATIVE REPORT    PATIENT NAME: Lisandra Martínez                 :        1950  MED REC NO:   [id number]                          ROOM:  ACCOUNT NO:   [de-identified]                           ADMIT DATE: 2023  PROVIDER:     Sabino Alanis DO    DATE OF PROCEDURE:  2023    PREOPERATIVE DIAGNOSIS:  Peripheral arterial disease. POSTOPERATIVE DIAGNOSIS:  Peripheral arterial disease. OPERATION PERFORMED:  1. Ultrasound-guided access to the right common femoral artery. 2.  Abdominal aortogram.  3.  Left lower extremity angiogram.  4.  Balloon angioplasty of the left proximal external iliac artery using  a 5 mm balloon. 5.  Treatment of occluded superficial femoral artery using a 4 x 60 mm  balloon followed by a 4 x 250 mm drug-coated IN. PACT balloon to treat  the entire SFA. 6.  Selective catheterization of the popliteal artery and left lower  extremity angiogram.  7.  Stent placement of the proximal external iliac artery using 8 x 27  mm Visi-Pro stent. SURGEON:  Sabino Alanis DO    ASSISTANT:  None. CONTRAST:  170 mL. COMPLICATIONS:  None apparent. ANESTHESIA:  Local sedation. ASA CLASSIFICATION:  II.    MALLAMPATI SCORE:  II.    TOTAL SEDATION:  2 mg of Versed, 100 mcg of fentanyl. Total sedation  was from 8 am to 8:40 a.m. totaling moderate sedation of 40 minutes. ESTIMATED BLOOD LOSS:  Less than 50 mL. INDICATIONS:  This is a 77-year-old female patient with left lower  extremity claudication symptoms. She underwent evaluation with duplex demonstrating an RICHARD of 0.22. She had previously undergone SFA  angioplasty on the right side a couple of years ago. She denies any  significant rest pain but she does have occasional restless leg  syndrome, which seems to be unrelated.   All the risks, benefits and  alternatives of the angiography including pain, infection, bleeding,  embolization, thrombosis, pseudoaneurysm were clearly reviewed with the  patient. She understood and gave written informed consent. OPERATIVE PROCEDURE:  The patient was brought into the cath lab and  placed supine on the table. Appropriate monitoring lines including  continuous blood pressure, EKG and pulse oximetry were placed on the  patient. A qualified nurse observer was in the room to administer  sedation. We used a combination of Versed and fentanyl. Total sedation  time was totaling 40 minutes of moderate sedation time which I was  directly observing. Bilateral lower extremities were prepped and draped  in the usual sterile fashion. A time-out was called for indication,  patient and laterality. The right common femoral artery was visualized  under ultrasound. It was patent and pulsatile. I directly visualized  the micropuncture needle and accessed the vessel after anesthetizing the  skin. I placed a microintroducer followed by a 6-Lao sheath over a  Bentson wire. I placed flush catheter into the abdominal aorta and shot  an abdominal aortogram.  Bilateral renal arteries were patent, terminal  aorta was patent, bilateral common iliac arteries were patent. The left  proximal external iliac artery has a greater than 50% stenosis. The  hypogastrics were patent but diseased on both sides. Catheter was  placed beyond the 50% stenosis in the external iliac artery and left  lower extremity angiogram was then obtained. The distal external iliac  arteries were patent, the common femoral was patent, the profunda was  patent, the SFA was patent. Further angiogram demonstrates that the  distal SFA is actually occluded for about 5 cm. There is reconstitution  above-knee popliteal artery.   Beyond this, the P1, P2, and P3 segments  of the popliteal artery as well as the anterior tibial artery and  posterior tibial artery are patent down to the foot with good two-vessel  runoff. The patient was given 3000 units of heparin. An Advantage  Glidewire was then placed in the SFA proximal to the occlusion. A 6/45  sheath was placed up and over proximal to the external iliac artery  lesion. A 5 mm balloon was used to angioplasty this lesion. The balloon  was then swallowed. The sheath was placed in the distal external iliac  artery. Using a Zitra.com 90 cm catheter and a Glidewire Advantage, I  navigated through the occlusion of the left lower extremity. Placed the  catheter into the P1 segment of the popliteal artery and then proceeded  to take an angiogram.  It demonstrated luminal gain that popliteal artery was  widely patent beyond, there was no evidence of debris. Therefore we  placed the wire down to the P2 segment of the popliteal artery and then  insufflated the lesion using 4 x 60 mm balloon. There was significant  waste. There was also evidence of some dissection. I therefore  selected a long 4 x 250 mm drug-coated balloon. I left it up for 3  minutes. It was a BostInno IN. PACT balloon. After this, we had  improved axial flow and runoff via the SFA with good brisk flow. I then  turned my attention back to the external iliac artery lesion. I pulled  the sheath back and retracted, shot an arteriogram redemonstrating the  severe stenosis. I placed an 8 x 27 mm Visi-Pro balloon-expandable  stent. After this, repeat arteriogram demonstrated good opacification,  good match for the artery itself. Repeat imaging of the foot  demonstrates improved axial flow with brisk flow down to the foot  through the dorsalis pedis and posterior tibial artery. The sheath was  then withdrawn. A short 6-Malay sheath was placed on the right side  and a Mynx closure device was used with good hemostatic effect. There  were no immediate complications. The patient was taken to the recovery  area in stable condition.   I was present and performed all critical  aspects of the procedure.         Eun Guerrero DO    D: 02/20/2023 8:52:52       T: 02/20/2023 13:46:08     [email or web address]  Job#: [id number]     Doc#: [id number]    CC:

## 2023-03-10 ENCOUNTER — PROCEDURE VISIT (OUTPATIENT)
Dept: SURGERY | Age: 73
End: 2023-03-10
Payer: MEDICARE

## 2023-03-10 DIAGNOSIS — I70.229 REST PAIN OF LOWER EXTREMITY DUE TO ATHEROSCLEROSIS (HCC): ICD-10-CM

## 2023-03-10 DIAGNOSIS — I73.9 PERIPHERAL ARTERIAL DISEASE (HCC): Primary | ICD-10-CM

## 2023-03-10 PROCEDURE — 93926 LOWER EXTREMITY STUDY: CPT | Performed by: SURGERY

## 2023-03-23 ENCOUNTER — OFFICE VISIT (OUTPATIENT)
Dept: VASCULAR SURGERY | Age: 73
End: 2023-03-23
Payer: MEDICARE

## 2023-03-23 VITALS — HEIGHT: 63 IN | BODY MASS INDEX: 23.03 KG/M2

## 2023-03-23 DIAGNOSIS — I73.9 PERIPHERAL ARTERIAL DISEASE (HCC): Primary | ICD-10-CM

## 2023-03-23 PROCEDURE — 99213 OFFICE O/P EST LOW 20 MIN: CPT | Performed by: STUDENT IN AN ORGANIZED HEALTH CARE EDUCATION/TRAINING PROGRAM

## 2023-03-23 PROCEDURE — 1123F ACP DISCUSS/DSCN MKR DOCD: CPT | Performed by: STUDENT IN AN ORGANIZED HEALTH CARE EDUCATION/TRAINING PROGRAM

## 2023-03-23 NOTE — PROGRESS NOTES
Lincoln Castillo (:  1950) is a 68 y.o. female,Established patient, here for evaluation of the following chief complaint(s):  Follow-up         ASSESSMENT/PLAN:  1. Peripheral arterial disease [facility])    This is a 68year old female patient who is improved s/p left lower extremity endovascular intervention. Will continue with routine surveillance to include bilateral lower extremity duplex in 3 months with special attention to monitoring her left SFA stenosis. All questions answered. Subjective   SUBJECTIVE/OBJECTIVE:  This is a 68year old female patient who presents to the office today for follow up after angiogram. She has evidence of improved flow in the left leg with increase in RICHARD from 0.29 to 0.92. Duplex personally reviewed. The external iliac artery stent is patent however her SFA still has appreciable stenosis after treating a complete occlusion. Overall she feels better, her pain is improved and she is walking. She still smokes. Objective   Physical Exam  Constitutional:       Appearance: Normal appearance. Cardiovascular:      Rate and Rhythm: Normal rate and regular rhythm. Pulses: Normal pulses. Pulmonary:      Effort: Pulmonary effort is normal. No respiratory distress. Skin:     General: Skin is warm and dry. Neurological:      Mental Status: She is alert.         Niki Gonzalez, DO, FACS, FSVS, [address] [facility]

## 2024-03-01 LAB
MAMMOGRAPHY, EXTERNAL: NORMAL
MAMMOGRAPHY, EXTERNAL: NORMAL

## 2024-05-29 DIAGNOSIS — I73.9 PERIPHERAL ARTERIAL DISEASE (HCC): Primary | ICD-10-CM

## 2024-09-23 ENCOUNTER — HOSPITAL ENCOUNTER (OUTPATIENT)
Age: 74
Discharge: HOME | End: 2024-09-25
Payer: MEDICARE

## 2024-09-23 VITALS
BODY MASS INDEX: 27.29 KG/M2 | WEIGHT: 130 LBS | HEIGHT: 58 IN | SYSTOLIC BLOOD PRESSURE: 168 MMHG | DIASTOLIC BLOOD PRESSURE: 59 MMHG

## 2024-09-23 DIAGNOSIS — I50.9 HEART FAILURE (HCC): Primary | ICD-10-CM

## 2024-09-23 LAB
ECHO AO ASC DIAM: 2.9 CM
ECHO AO ASCENDING AORTA INDEX: 1.91 CM/M2
ECHO AO ROOT DIAM: 3.4 CM
ECHO AO ROOT INDEX: 2.24 CM/M2
ECHO AR MAX VEL PISA: 4.3 M/S
ECHO AV AREA PEAK VELOCITY: 1.4 CM2
ECHO AV AREA VTI: 1.9 CM2
ECHO AV AREA/BSA PEAK VELOCITY: 0.9 CM2/M2
ECHO AV AREA/BSA VTI: 1.3 CM2/M2
ECHO AV MEAN GRADIENT: 10 MMHG
ECHO AV MEAN VELOCITY: 1.4 M/S
ECHO AV PEAK GRADIENT: 20 MMHG
ECHO AV PEAK VELOCITY: 2.3 M/S
ECHO AV REGURGITANT PHT: 515 MS
ECHO AV VELOCITY RATIO: 0.43
ECHO AV VTI: 47.3 CM
ECHO BSA: 1.55 M2
ECHO EST RA PRESSURE: 10 MMHG
ECHO IVC PROX: 1.3 CM
ECHO LA AREA 2C: 16.7 CM2
ECHO LA AREA 4C: 19.4 CM2
ECHO LA MAJOR AXIS: 6 CM
ECHO LA MINOR AXIS: 5.5 CM
ECHO LA VOL BP: 46 ML (ref 22–52)
ECHO LA VOL MOD A2C: 42 ML (ref 22–52)
ECHO LA VOL MOD A4C: 48 ML (ref 22–52)
ECHO LA VOL/BSA BIPLANE: 30 ML/M2 (ref 16–34)
ECHO LA VOLUME INDEX MOD A2C: 28 ML/M2 (ref 16–34)
ECHO LA VOLUME INDEX MOD A4C: 32 ML/M2 (ref 16–34)
ECHO LV E' LATERAL VELOCITY: 7.4 CM/S
ECHO LV E' SEPTAL VELOCITY: 4 CM/S
ECHO LV EDV 3D: 98 ML
ECHO LV EDV A2C: 61 ML
ECHO LV EDV A4C: 63 ML
ECHO LV EDV INDEX 3D: 64 ML/M2
ECHO LV EDV INDEX A4C: 41 ML/M2
ECHO LV EDV NDEX A2C: 40 ML/M2
ECHO LV EF PHYSICIAN: 65 %
ECHO LV EJECTION FRACTION A2C: 70 %
ECHO LV EJECTION FRACTION A4C: 67 %
ECHO LV ESV 3D: 47 ML
ECHO LV ESV A2C: 18 ML
ECHO LV ESV A4C: 21 ML
ECHO LV ESV INDEX 3D: 31 ML/M2
ECHO LV ESV INDEX A2C: 12 ML/M2
ECHO LV ESV INDEX A4C: 14 ML/M2
ECHO LV FRACTIONAL SHORTENING: 44 % (ref 28–44)
ECHO LV INTERNAL DIMENSION DIASTOLE INDEX: 2.24 CM/M2
ECHO LV INTERNAL DIMENSION DIASTOLIC: 3.4 CM (ref 3.9–5.3)
ECHO LV INTERNAL DIMENSION SYSTOLIC INDEX: 1.25 CM/M2
ECHO LV INTERNAL DIMENSION SYSTOLIC: 1.9 CM
ECHO LV IVSD: 2 CM (ref 0.6–0.9)
ECHO LV MASS 2D: 318.9 G (ref 67–162)
ECHO LV MASS 3D INDEX: 113.2 G/M2
ECHO LV MASS 3D: 172 G
ECHO LV MASS INDEX 2D: 209.8 G/M2 (ref 43–95)
ECHO LV POSTERIOR WALL DIASTOLIC: 2.1 CM (ref 0.6–0.9)
ECHO LV RELATIVE WALL THICKNESS RATIO: 1.24
ECHO LVOT AREA: 3.1 CM2
ECHO LVOT AV VTI INDEX: 0.6
ECHO LVOT DIAM: 2 CM
ECHO LVOT MEAN GRADIENT: 3 MMHG
ECHO LVOT PEAK GRADIENT: 4 MMHG
ECHO LVOT PEAK VELOCITY: 1 M/S
ECHO LVOT STROKE VOLUME INDEX: 58.3 ML/M2
ECHO LVOT SV: 88.5 ML
ECHO LVOT VTI: 28.2 CM
ECHO MV A VELOCITY: 1.18 M/S
ECHO MV AREA VTI: 3.2 CM2
ECHO MV E DECELERATION TIME (DT): 283 MS
ECHO MV E VELOCITY: 0.45 M/S
ECHO MV E/A RATIO: 0.38
ECHO MV E/E' LATERAL: 6.08
ECHO MV E/E' RATIO (AVERAGED): 8.67
ECHO MV E/E' SEPTAL: 11.25
ECHO MV LVOT VTI INDEX: 0.97
ECHO MV MAX VELOCITY: 1.1 M/S
ECHO MV MEAN GRADIENT: 1 MMHG
ECHO MV MEAN VELOCITY: 0.5 M/S
ECHO MV PEAK GRADIENT: 4 MMHG
ECHO MV VTI: 27.3 CM
ECHO PV MAX VELOCITY: 1 M/S
ECHO PV PEAK GRADIENT: 4 MMHG
ECHO RA AREA 4C: 13.4 CM2
ECHO RA END SYSTOLIC VOLUME APICAL 4 CHAMBER INDEX BSA: 16 ML/M2
ECHO RA VOLUME: 25 ML
ECHO RIGHT VENTRICULAR SYSTOLIC PRESSURE (RVSP): 30 MMHG
ECHO RV BASAL DIMENSION: 4.5 CM
ECHO RV FREE WALL PEAK S': 9.3 CM/S
ECHO RV MID DIMENSION: 2.8 CM
ECHO RV TAPSE: 2 CM (ref 1.7–?)
ECHO TV REGURGITANT MAX VELOCITY: 2.23 M/S
ECHO TV REGURGITANT PEAK GRADIENT: 20 MMHG

## 2024-09-23 PROCEDURE — 93306 TTE W/DOPPLER COMPLETE: CPT | Performed by: INTERNAL MEDICINE

## 2024-09-23 PROCEDURE — 93306 TTE W/DOPPLER COMPLETE: CPT

## 2025-05-31 ENCOUNTER — TRANSCRIBE ORDERS (OUTPATIENT)
Dept: ADMINISTRATIVE | Age: 75
End: 2025-05-31

## 2025-05-31 DIAGNOSIS — Z00.00 PREVENTATIVE HEALTH CARE: Primary | ICD-10-CM

## 2025-06-18 ENCOUNTER — TRANSCRIBE ORDERS (OUTPATIENT)
Dept: ADMINISTRATIVE | Age: 75
End: 2025-06-18

## 2025-06-18 DIAGNOSIS — F17.200 TOBACCO DEPENDENCE: ICD-10-CM

## 2025-06-18 DIAGNOSIS — Z12.2 ENCOUNTER FOR SCREENING FOR MALIGNANT NEOPLASM OF RESPIRATORY ORGANS: Primary | ICD-10-CM
